# Patient Record
Sex: FEMALE | Race: WHITE | NOT HISPANIC OR LATINO | Employment: UNEMPLOYED | URBAN - METROPOLITAN AREA
[De-identification: names, ages, dates, MRNs, and addresses within clinical notes are randomized per-mention and may not be internally consistent; named-entity substitution may affect disease eponyms.]

---

## 2023-02-21 LAB
ALANINE AMINOTRANSFERASE (SGPT) (U/L) IN SER/PLAS: 36 U/L (ref 7–45)
ALBUMIN (G/DL) IN SER/PLAS: 4 G/DL (ref 3.4–5)
ALKALINE PHOSPHATASE (U/L) IN SER/PLAS: 57 U/L (ref 33–110)
ANION GAP IN SER/PLAS: 10 MMOL/L (ref 10–20)
ASPARTATE AMINOTRANSFERASE (SGOT) (U/L) IN SER/PLAS: 33 U/L (ref 9–39)
BASOPHILS (10*3/UL) IN BLOOD BY AUTOMATED COUNT: 0.02 X10E9/L (ref 0–0.1)
BASOPHILS/100 LEUKOCYTES IN BLOOD BY AUTOMATED COUNT: 0.3 % (ref 0–2)
BILIRUBIN TOTAL (MG/DL) IN SER/PLAS: 0.3 MG/DL (ref 0–1.2)
CALCIUM (MG/DL) IN SER/PLAS: 9.3 MG/DL (ref 8.6–10.6)
CARBON DIOXIDE, TOTAL (MMOL/L) IN SER/PLAS: 30 MMOL/L (ref 21–32)
CHLORIDE (MMOL/L) IN SER/PLAS: 106 MMOL/L (ref 98–107)
CHOLESTEROL (MG/DL) IN SER/PLAS: 181 MG/DL (ref 0–199)
CHOLESTEROL IN HDL (MG/DL) IN SER/PLAS: 65.1 MG/DL
CHOLESTEROL/HDL RATIO: 2.8
CREATININE (MG/DL) IN SER/PLAS: 0.97 MG/DL (ref 0.5–1.05)
EOSINOPHILS (10*3/UL) IN BLOOD BY AUTOMATED COUNT: 0.13 X10E9/L (ref 0–0.7)
EOSINOPHILS/100 LEUKOCYTES IN BLOOD BY AUTOMATED COUNT: 2.3 % (ref 0–6)
ERYTHROCYTE DISTRIBUTION WIDTH (RATIO) BY AUTOMATED COUNT: 14.7 % (ref 11.5–14.5)
ERYTHROCYTE MEAN CORPUSCULAR HEMOGLOBIN CONCENTRATION (G/DL) BY AUTOMATED: 30.9 G/DL (ref 32–36)
ERYTHROCYTE MEAN CORPUSCULAR VOLUME (FL) BY AUTOMATED COUNT: 95 FL (ref 80–100)
ERYTHROCYTES (10*6/UL) IN BLOOD BY AUTOMATED COUNT: 4.1 X10E12/L (ref 4–5.2)
GFR FEMALE: 70 ML/MIN/1.73M2
GLUCOSE (MG/DL) IN SER/PLAS: 70 MG/DL (ref 74–99)
HEMATOCRIT (%) IN BLOOD BY AUTOMATED COUNT: 39.1 % (ref 36–46)
HEMOGLOBIN (G/DL) IN BLOOD: 12.1 G/DL (ref 12–16)
HEPATITIS A VIRUS IGM AB PRESENCE IN SER/PLAS BY IMMUNOASSAY: NONREACTIVE
HEPATITIS B VIRUS CORE IGM AB PRESENCE IN SER/PLAS BY IMMUNOASSY: NONREACTIVE
HEPATITIS B VIRUS SURFACE AG PRESENCE IN SERUM: NONREACTIVE
HEPATITIS C VIRUS AB PRESENCE IN SERUM: NONREACTIVE
HIV 1/ 2 AG/AB SCREEN: NONREACTIVE
IMMATURE GRANULOCYTES/100 LEUKOCYTES IN BLOOD BY AUTOMATED COUNT: 0.2 % (ref 0–0.9)
LDL: 71 MG/DL (ref 0–99)
LEUKOCYTES (10*3/UL) IN BLOOD BY AUTOMATED COUNT: 5.8 X10E9/L (ref 4.4–11.3)
LYMPHOCYTES (10*3/UL) IN BLOOD BY AUTOMATED COUNT: 1.73 X10E9/L (ref 1.2–4.8)
LYMPHOCYTES/100 LEUKOCYTES IN BLOOD BY AUTOMATED COUNT: 30 % (ref 13–44)
MONOCYTES (10*3/UL) IN BLOOD BY AUTOMATED COUNT: 0.55 X10E9/L (ref 0.1–1)
MONOCYTES/100 LEUKOCYTES IN BLOOD BY AUTOMATED COUNT: 9.5 % (ref 2–10)
NEUTROPHILS (10*3/UL) IN BLOOD BY AUTOMATED COUNT: 3.32 X10E9/L (ref 1.2–7.7)
NEUTROPHILS/100 LEUKOCYTES IN BLOOD BY AUTOMATED COUNT: 57.7 % (ref 40–80)
NON HDL CHOLESTEROL: 116 MG/DL
NRBC (PER 100 WBCS) BY AUTOMATED COUNT: 0 /100 WBC (ref 0–0)
PLATELETS (10*3/UL) IN BLOOD AUTOMATED COUNT: 278 X10E9/L (ref 150–450)
POTASSIUM (MMOL/L) IN SER/PLAS: 4.5 MMOL/L (ref 3.5–5.3)
PROTEIN TOTAL: 6.2 G/DL (ref 6.4–8.2)
SODIUM (MMOL/L) IN SER/PLAS: 141 MMOL/L (ref 136–145)
TRIGLYCERIDE (MG/DL) IN SER/PLAS: 226 MG/DL (ref 0–149)
UREA NITROGEN (MG/DL) IN SER/PLAS: 18 MG/DL (ref 6–23)
VLDL: 45 MG/DL (ref 0–40)

## 2023-02-22 LAB
NIL(NEG) CONTROL SPOT COUNT: NORMAL
PANEL A SPOT COUNT: 0
PANEL B SPOT COUNT: 1
POS CONTROL SPOT COUNT: NORMAL
T-SPOT. TB INTERPRETATION: NEGATIVE

## 2023-10-30 ENCOUNTER — SPECIALTY PHARMACY (OUTPATIENT)
Dept: PHARMACY | Facility: CLINIC | Age: 53
End: 2023-10-30

## 2023-11-01 NOTE — PROGRESS NOTES
[facility] Clinical Note    Yue Monroe is a 53 y.o. female, who is on the specialty pharmacy service for management of: Dermatology Core with status of: (Enrolled)     Yue was contacted on 11/1/2023.    Refer to the encounter summary report for documentation details about patient counseling and education.      Medication Adherence  The importance of adherence was discussed with the patient and they were advised to take the medication as prescribed by their provider. Yue was encouraged to call her physician's office if they have a question regarding a missed dose.     Conclusion  Rate your quality of life on scale of 1-10: -- (unable to assess)  Rate your satisfaction with  Specialty Pharmacy on scale of 1-10: 10 - Completely satisfied      Patient advised to contact the pharmacy if there are any changes to her medication list, including prescriptions, OTC medications, herbal products, or supplements. Patient was advised of [facility]’s dispensing process, refill timeline, contact information ([phone]), and patient management follow up. Patient confirmed understanding of education conducted during assessment. All patient questions and concerns were addressed to the best of my ability. Patient was encouraged to contact the specialty pharmacy with any questions or concerns.    Confirmed follow-up outreaches are properly scheduled. Reviewed goals of therapy in the program targets.    Rex Bee, PharmD

## 2023-12-16 ENCOUNTER — SPECIALTY PHARMACY (OUTPATIENT)
Dept: PHARMACY | Facility: CLINIC | Age: 53
End: 2023-12-16

## 2023-12-20 PROCEDURE — RXMED WILLOW AMBULATORY MEDICATION CHARGE

## 2023-12-26 ENCOUNTER — PHARMACY VISIT (OUTPATIENT)
Dept: PHARMACY | Facility: CLINIC | Age: 53
End: 2023-12-26
Payer: MEDICAID

## 2024-02-27 ENCOUNTER — OFFICE VISIT (OUTPATIENT)
Dept: DERMATOLOGY | Facility: CLINIC | Age: 54
End: 2024-02-27
Payer: COMMERCIAL

## 2024-02-27 ENCOUNTER — LAB (OUTPATIENT)
Dept: LAB | Facility: LAB | Age: 54
End: 2024-02-27
Payer: COMMERCIAL

## 2024-02-27 DIAGNOSIS — L40.9 PSORIASIS: Primary | ICD-10-CM

## 2024-02-27 DIAGNOSIS — Z79.899 HIGH RISK MEDICATION USE: ICD-10-CM

## 2024-02-27 DIAGNOSIS — I83.93 SPIDER VEINS OF BOTH LOWER EXTREMITIES: ICD-10-CM

## 2024-02-27 DIAGNOSIS — D23.9 INTRADERMAL NEVUS: ICD-10-CM

## 2024-02-27 DIAGNOSIS — L40.9 PSORIASIS: ICD-10-CM

## 2024-02-27 LAB
BASOPHILS # BLD AUTO: 0.03 X10*3/UL (ref 0–0.1)
BASOPHILS NFR BLD AUTO: 0.2 %
EOSINOPHIL # BLD AUTO: 0.06 X10*3/UL (ref 0–0.7)
EOSINOPHIL NFR BLD AUTO: 0.4 %
ERYTHROCYTE [DISTWIDTH] IN BLOOD BY AUTOMATED COUNT: 15.5 % (ref 11.5–14.5)
HCT VFR BLD AUTO: 34.6 % (ref 36–46)
HGB BLD-MCNC: 10.8 G/DL (ref 12–16)
HIV 1+2 AB+HIV1 P24 AG SERPL QL IA: NONREACTIVE
IMM GRANULOCYTES # BLD AUTO: 0.12 X10*3/UL (ref 0–0.7)
IMM GRANULOCYTES NFR BLD AUTO: 0.9 % (ref 0–0.9)
LYMPHOCYTES # BLD AUTO: 1.61 X10*3/UL (ref 1.2–4.8)
LYMPHOCYTES NFR BLD AUTO: 11.6 %
MCH RBC QN AUTO: 28.2 PG (ref 26–34)
MCHC RBC AUTO-ENTMCNC: 31.2 G/DL (ref 32–36)
MCV RBC AUTO: 90 FL (ref 80–100)
MONOCYTES # BLD AUTO: 1.13 X10*3/UL (ref 0.1–1)
MONOCYTES NFR BLD AUTO: 8.1 %
NEUTROPHILS # BLD AUTO: 10.94 X10*3/UL (ref 1.2–7.7)
NEUTROPHILS NFR BLD AUTO: 78.8 %
NRBC BLD-RTO: 0 /100 WBCS (ref 0–0)
PLATELET # BLD AUTO: 610 X10*3/UL (ref 150–450)
RBC # BLD AUTO: 3.83 X10*6/UL (ref 4–5.2)
WBC # BLD AUTO: 13.9 X10*3/UL (ref 4.4–11.3)

## 2024-02-27 PROCEDURE — 86803 HEPATITIS C AB TEST: CPT

## 2024-02-27 PROCEDURE — 86706 HEP B SURFACE ANTIBODY: CPT

## 2024-02-27 PROCEDURE — 85025 COMPLETE CBC W/AUTO DIFF WBC: CPT

## 2024-02-27 PROCEDURE — 80053 COMPREHEN METABOLIC PANEL: CPT

## 2024-02-27 PROCEDURE — 80061 LIPID PANEL: CPT

## 2024-02-27 PROCEDURE — 86481 TB AG RESPONSE T-CELL SUSP: CPT

## 2024-02-27 PROCEDURE — 99214 OFFICE O/P EST MOD 30 MIN: CPT | Performed by: DERMATOLOGY

## 2024-02-27 PROCEDURE — 36415 COLL VENOUS BLD VENIPUNCTURE: CPT

## 2024-02-27 PROCEDURE — 87340 HEPATITIS B SURFACE AG IA: CPT

## 2024-02-27 PROCEDURE — 86704 HEP B CORE ANTIBODY TOTAL: CPT

## 2024-02-27 PROCEDURE — 87389 HIV-1 AG W/HIV-1&-2 AB AG IA: CPT

## 2024-02-27 RX ORDER — GABAPENTIN 300 MG/1
300 CAPSULE ORAL 3 TIMES DAILY
COMMUNITY
Start: 2024-03-05 | End: 2024-04-04

## 2024-02-27 RX ORDER — CARBAMAZEPINE 100 MG/1
100 TABLET, EXTENDED RELEASE ORAL 2 TIMES DAILY
COMMUNITY
Start: 2023-12-06 | End: 2024-12-05

## 2024-02-27 RX ORDER — ONDANSETRON 4 MG/1
TABLET, FILM COATED ORAL
COMMUNITY
Start: 2024-01-16

## 2024-02-27 RX ORDER — ATENOLOL AND CHLORTHALIDONE TABLET 50; 25 MG/1; MG/1
TABLET ORAL
COMMUNITY

## 2024-02-27 RX ORDER — PANTOPRAZOLE SODIUM 40 MG/1
40 TABLET, DELAYED RELEASE ORAL
COMMUNITY
Start: 2022-10-23

## 2024-02-27 RX ORDER — TIZANIDINE 2 MG/1
TABLET ORAL
COMMUNITY
Start: 2024-01-05

## 2024-02-27 RX ORDER — DICLOFENAC SODIUM 10 MG/G
4 GEL TOPICAL 2 TIMES DAILY
COMMUNITY
Start: 2024-02-05

## 2024-02-27 RX ORDER — HYDROXYZINE HYDROCHLORIDE 50 MG/1
TABLET, FILM COATED ORAL
COMMUNITY
Start: 2022-02-20

## 2024-02-27 NOTE — PROGRESS NOTES
Bonita Monroe is a 53 y.o. female who presents for the following: Psoriasis (Generalized- taking skyrizi - states clear today. ).     Review of Systems:  No other skin or systemic complaints other than what is documented elsewhere in the note.    The following portions of the chart were reviewed this encounter and updated as appropriate:   Allergies  Meds  Problems  Med Hx  Surg Hx  Fam Hx         Skin Cancer History  No skin cancer on file.      Specialty Problems    None       Objective   Well appearing patient in no apparent distress; mood and affect are within normal limits.    A focused skin examination was performed. All findings within normal limits unless otherwise noted below.    Assessment/Plan   1. Psoriasis  Clear today    Tolerating skyrizi well with complete clearance of Psoriasis  -Initiated May 2022  -Patient denies side effects  -Does have mild limited recurrence of psoriasis on the ears the week prior to injection  -Patient wishes to continue  -Patient declines use of topicals    -Given refractory nature of psoriasis, recommend to continue rx Skyrizi/Risankizumab  -Potential adverse effects of rx Skyrizi/Risankizumab include, but not limited to, increased risk of infections (both cutaneous and systemic), injection site reactions, upper respiratory infections, headaches.   -This medication requires annual blood work monitoring.   -No live vaccines while on this medication.   -This medication requires annual blood work monitoring, including CBC, CMP, HIV, Hepatitis B/C, Quantiferon gold. Orders placed today.  -Upon review of laboratory results, if permissible, will send rx to continue rx Skyrizi/Risankizumab 150 mg subcutaneously every 12 weeks.     Related Procedures  Comprehensive Metabolic Panel  Lipid Panel  HIV 1/2 Antigen/Antibody Screen with Reflex to Confirmation  CBC and Auto Differential  Hepatitis C Antibody  Hepatitis B Surface Antigen  Hepatitis B Surface  Antibody  Hepatitis B Core Antibody, Total  T-Spot TB    2. High risk medication use    Related Procedures  Comprehensive Metabolic Panel  Lipid Panel  HIV 1/2 Antigen/Antibody Screen with Reflex to Confirmation  CBC and Auto Differential  Hepatitis C Antibody  Hepatitis B Surface Antigen  Hepatitis B Surface Antibody  Hepatitis B Core Antibody, Total  T-Spot TB    3. Spider veins of both lower extremities (2)  Left Lower Leg - Anterior, Right Lower Leg - Anterior  Spider veins    Patient reports painful spider veins, potential for underlying varicosity on the R medial lower leg  -Referral to vascular for evaluation    Related Procedures  Referral to Vascular Surgery    4. Intradermal nevus  Right Upper Back  Dome shaped, flesh-colored/tan papule with comma shaped vessels on dermoscopy    (lesion of patient's concern)  -Discussed nature of diagnosis  -Benign-appearing features on examination  -Reassurance, recommend observation        Follow up in 1 year for PsO (Skyrizi)  Discussed if there are any changes or development of concerning symptoms (lesion/skin condition is changing, bleeding, enlarging, or worsening) the patient is to contact my office. The patient verbalizes understanding.    Maricruz Quezada MD  2/27/2024    Addendum: Patients labs received and reviewed. Permissible to continue Skyrizi. Patient did have an elevated white blood cell count; patient should follow up with PCP for this if persistent. Rx for 1 year Skyrizi sent to  Specialty Pharmacy. Nurse to notify the patient.  Maricruz Quezada MD  02/29/24    Addendum: Skyrizi approved until Jan 2026.    Mraicruz Quezada MD  01/28/25

## 2024-02-28 LAB
ALBUMIN SERPL BCP-MCNC: 3.5 G/DL (ref 3.4–5)
ALP SERPL-CCNC: 91 U/L (ref 33–110)
ALT SERPL W P-5'-P-CCNC: 16 U/L (ref 7–45)
ANION GAP SERPL CALC-SCNC: 17 MMOL/L (ref 10–20)
AST SERPL W P-5'-P-CCNC: 16 U/L (ref 9–39)
BILIRUB SERPL-MCNC: 0.3 MG/DL (ref 0–1.2)
BUN SERPL-MCNC: 23 MG/DL (ref 6–23)
CALCIUM SERPL-MCNC: 9.5 MG/DL (ref 8.6–10.6)
CHLORIDE SERPL-SCNC: 103 MMOL/L (ref 98–107)
CHOLEST SERPL-MCNC: 168 MG/DL (ref 0–199)
CHOLESTEROL/HDL RATIO: 3.7
CO2 SERPL-SCNC: 26 MMOL/L (ref 21–32)
CREAT SERPL-MCNC: 1.06 MG/DL (ref 0.5–1.05)
EGFRCR SERPLBLD CKD-EPI 2021: 63 ML/MIN/1.73M*2
GLUCOSE SERPL-MCNC: 80 MG/DL (ref 74–99)
HBV CORE AB SER QL: NONREACTIVE
HBV SURFACE AB SER-ACNC: <3.1 MIU/ML
HBV SURFACE AG SERPL QL IA: NONREACTIVE
HCV AB SER QL: NONREACTIVE
HDLC SERPL-MCNC: 46 MG/DL
LDLC SERPL CALC-MCNC: 84 MG/DL
NON HDL CHOLESTEROL: 122 MG/DL (ref 0–149)
POTASSIUM SERPL-SCNC: 4.7 MMOL/L (ref 3.5–5.3)
PROT SERPL-MCNC: 6.7 G/DL (ref 6.4–8.2)
SODIUM SERPL-SCNC: 141 MMOL/L (ref 136–145)
TRIGL SERPL-MCNC: 190 MG/DL (ref 0–149)
VLDL: 38 MG/DL (ref 0–40)

## 2024-02-29 LAB
NIL(NEG) CONTROL SPOT COUNT: NORMAL
PANEL A SPOT COUNT: 0
PANEL B SPOT COUNT: 0
POS CONTROL SPOT COUNT: NORMAL
T-SPOT. TB INTERPRETATION: NEGATIVE

## 2024-02-29 RX ORDER — RISANKIZUMAB-RZAA 150 MG/ML
150 INJECTION SUBCUTANEOUS
Qty: 1 ML | Refills: 3 | Status: SHIPPED | OUTPATIENT
Start: 2024-02-29

## 2024-03-20 ENCOUNTER — SPECIALTY PHARMACY (OUTPATIENT)
Dept: PHARMACY | Facility: CLINIC | Age: 54
End: 2024-03-20

## 2024-03-20 PROCEDURE — RXMED WILLOW AMBULATORY MEDICATION CHARGE

## 2024-03-21 ENCOUNTER — PHARMACY VISIT (OUTPATIENT)
Dept: PHARMACY | Facility: CLINIC | Age: 54
End: 2024-03-21
Payer: MEDICAID

## 2024-04-29 ENCOUNTER — SPECIALTY PHARMACY (OUTPATIENT)
Dept: PHARMACY | Facility: CLINIC | Age: 54
End: 2024-04-29

## 2024-04-29 NOTE — PROGRESS NOTES
[facility] Clinical Note    Yue Monroe is a 53 y.o. female, who is on the specialty pharmacy service of: Dermatology Core.  Yue Monroe is taking: Skyrizi.     Yue was contacted on 4/29/2024.    Refer to the encounter summary report for documentation details about patient counseling and education.      Impression/Plan  Is patient high risk? (potential patients:  pregnancy, geriatric, pediatric)  no   Is laboratory follow-up needed? no  Is a clinical intervention needed?  no  Next assessment date?  6 months   Additional comments:    Medication Adherence  The importance of adherence was discussed with the patient and they were advised to take the medication as prescribed by their provider. Yue was encouraged to call her physician's office if they have a question regarding a missed dose.     QOL/Patient Satisfaction  Rate your quality of life on scale of 1-10: -- (unable to assess)  Rate your satisfaction with  Specialty Pharmacy on scale of 1-10: 10 - Completely satisfied      Patient advised to contact the pharmacy if there are any changes to her medication list, including prescriptions, OTC medications, herbal products, or supplements. Patient was advised of [facility]’s dispensing process, refill timeline, contact information ([phone]), and patient management follow up. Patient confirmed understanding of education conducted during assessment. All patient questions and concerns were addressed to the best of my ability. Patient was encouraged to contact the specialty pharmacy with any questions or concerns.    Confirmed follow-up outreaches are properly scheduled. Reviewed goals of therapy in the program targets.    Rex Bee, PharmD

## 2024-06-12 ENCOUNTER — SPECIALTY PHARMACY (OUTPATIENT)
Dept: PHARMACY | Facility: CLINIC | Age: 54
End: 2024-06-12

## 2024-06-12 PROCEDURE — RXMED WILLOW AMBULATORY MEDICATION CHARGE

## 2024-06-13 ENCOUNTER — PHARMACY VISIT (OUTPATIENT)
Dept: PHARMACY | Facility: CLINIC | Age: 54
End: 2024-06-13
Payer: MEDICAID

## 2024-09-05 ENCOUNTER — SPECIALTY PHARMACY (OUTPATIENT)
Dept: PHARMACY | Facility: CLINIC | Age: 54
End: 2024-09-05

## 2024-09-05 PROCEDURE — RXMED WILLOW AMBULATORY MEDICATION CHARGE

## 2024-09-20 ENCOUNTER — PHARMACY VISIT (OUTPATIENT)
Dept: PHARMACY | Facility: CLINIC | Age: 54
End: 2024-09-20
Payer: MEDICAID

## 2024-10-18 ENCOUNTER — SPECIALTY PHARMACY (OUTPATIENT)
Dept: PHARMACY | Facility: CLINIC | Age: 54
End: 2024-10-18

## 2024-10-18 NOTE — PROGRESS NOTES
[facility] Clinical Note    Yue Monroe is a 54 y.o. female, who is on the specialty pharmacy service for management of:  Dermatology Core.    Yue Monroe is taking: Skyrizi.    Medication Receipt Date: last refilled 9/24/24  Medication Start Date (planned or actual): 05/2022    Yue was contacted on 10/18/2024 at 4:39 PM for a virtual pharmacy visit with encounter number [id number] from:   [facility]  [address] [place] [id number]  Dept: [phone]  Dept Fax: [phone]    Yue was offered a Telemedicine Video visit or Telephone visit.  Yue consented to a telephone visit, which was performed.    The most recent encounter visit with the referring prescriber Dr. Maricruz Quezada on 2/27/24 (Date) was reviewed.  Pharmacy will continue to collaborate in the care of this patient with the referring prescriber Dr. Maricruz Quezada.    General Assessment      Impression/Plan  IMPRESSION/PLAN:  Is patient high risk (potential patients:  pregnancy, geriatric, pediatric)?    Is laboratory follow-up needed?   Is a clinical intervention needed?   Next reassessment date?   Additional comments:     Refer to the encounter summary report for documentation details about patient counseling and education.      Medication Adherence    The importance of adherence was discussed with the patient and they were advised to take the medication as prescribed by their provider. Patient was encouraged to call their physician's office if they have a question regarding a missed dose.        Patient was advised to contact the pharmacy if there are any changes to their medication list, including prescriptions, OTC medications, herbal products, or supplements. Patient was advised of [facility]'s dispensing process, refill timeline, contact information ([phone]), and patient management follow up. Patient confirmed  understanding of education conducted during assessment. All patient questions and concerns were addressed to the best of my ability. Patient was encouraged to contact the specialty pharmacy with any questions or concerns.    Confirmed follow-up outreaches are properly scheduled and reviewed goals of therapy with the patient.        Rex Bee, PharmD

## 2024-12-09 ENCOUNTER — SPECIALTY PHARMACY (OUTPATIENT)
Dept: PHARMACY | Facility: CLINIC | Age: 54
End: 2024-12-09

## 2025-01-27 ENCOUNTER — TELEPHONE (OUTPATIENT)
Dept: DERMATOLOGY | Facility: CLINIC | Age: 55
End: 2025-01-27
Payer: COMMERCIAL

## 2025-02-27 DIAGNOSIS — L40.9 PSORIASIS: ICD-10-CM

## 2025-02-27 DIAGNOSIS — Z79.899 HIGH RISK MEDICATION USE: ICD-10-CM

## 2025-02-27 RX ORDER — RISANKIZUMAB-RZAA 150 MG/ML
INJECTION SUBCUTANEOUS
Qty: 1 ML | Refills: 0 | Status: SHIPPED | OUTPATIENT
Start: 2025-02-27

## 2025-03-04 ENCOUNTER — APPOINTMENT (OUTPATIENT)
Dept: DERMATOLOGY | Facility: CLINIC | Age: 55
End: 2025-03-04
Payer: COMMERCIAL

## 2025-03-05 ENCOUNTER — APPOINTMENT (OUTPATIENT)
Dept: DERMATOLOGY | Facility: CLINIC | Age: 55
End: 2025-03-05
Payer: COMMERCIAL

## 2025-03-19 ENCOUNTER — APPOINTMENT (OUTPATIENT)
Dept: DERMATOLOGY | Facility: CLINIC | Age: 55
End: 2025-03-19
Payer: COMMERCIAL

## 2025-03-19 DIAGNOSIS — Z79.899 HIGH RISK MEDICATION USE: ICD-10-CM

## 2025-03-19 DIAGNOSIS — L65.0 TELOGEN EFFLUVIUM: ICD-10-CM

## 2025-03-19 DIAGNOSIS — L40.9 PSORIASIS: Primary | ICD-10-CM

## 2025-03-19 DIAGNOSIS — L98.9 DERMATOLOGIC PROBLEM: ICD-10-CM

## 2025-03-19 PROCEDURE — 99214 OFFICE O/P EST MOD 30 MIN: CPT | Performed by: DERMATOLOGY

## 2025-03-19 RX ORDER — MIDODRINE HYDROCHLORIDE 5 MG/1
15 TABLET ORAL 3 TIMES DAILY
COMMUNITY
Start: 2024-10-21

## 2025-03-19 RX ORDER — LANOLIN ALCOHOL/MO/W.PET/CERES
CREAM (GRAM) TOPICAL
COMMUNITY
Start: 2025-03-05

## 2025-03-19 RX ORDER — ACETAMINOPHEN 500 MG
150 TABLET ORAL
COMMUNITY
Start: 2024-12-06

## 2025-03-19 RX ORDER — POTASSIUM CHLORIDE 1.5 G/1
20 POWDER, FOR SOLUTION ORAL
COMMUNITY
Start: 2024-11-20 | End: 2025-11-20

## 2025-03-19 RX ORDER — GABAPENTIN 400 MG/1
1 CAPSULE ORAL
COMMUNITY
Start: 2025-02-25

## 2025-03-19 RX ORDER — ONDANSETRON 4 MG/1
TABLET, ORALLY DISINTEGRATING ORAL
COMMUNITY
Start: 2024-10-22

## 2025-03-19 RX ORDER — FUROSEMIDE 20 MG/1
20 TABLET ORAL
COMMUNITY
Start: 2025-02-18

## 2025-03-19 RX ORDER — HYDROXYZINE PAMOATE 25 MG/1
CAPSULE ORAL
COMMUNITY

## 2025-03-19 RX ORDER — ALBUTEROL SULFATE 90 UG/1
2 INHALANT RESPIRATORY (INHALATION) EVERY 4 HOURS PRN
COMMUNITY
Start: 2024-04-11 | End: 2025-04-11

## 2025-03-19 NOTE — PROGRESS NOTES
Bonita Monroe is a 54 y.o. female who presents for the following: Psoriasis (Pt on skyrizi- good response. Pt states clear today but has flares about 2 weeks before injection due, ears and eyebrows. ) and Alopecia (Thinning/balding- x 3 months- pt states was hospitalized for 2-3 months, got out of rehab in November. Pt states has not tried anything for treatments. ).     Review of Systems:  No other skin or systemic complaints other than what is documented elsewhere in the note.    The following portions of the chart were reviewed this encounter and updated as appropriate:   Allergies  Meds  Problems  Med Hx  Surg Hx  Fam Hx                Objective   Well appearing patient in no apparent distress; mood and affect are within normal limits.    A focused skin examination was performed. All findings within normal limits unless otherwise noted below.    Assessment/Plan   1. Psoriasis  Clear today    Tolerating skyrizi well with complete clearance of Psoriasis  -Initiated May 2022  -Patient denies side effects  -Does have mild limited recurrence of psoriasis on the ears the week prior to injection  -Patient wishes to continue  -Patient declines use of topicals  -Due for labs; ordered today  -Will send rx for Skyrizi once labs received  -May start dermanail for brittle toenails    -Given refractory nature of psoriasis, recommend to continue rx Skyrizi/Risankizumab  -Potential adverse effects of rx Skyrizi/Risankizumab include, but not limited to, increased risk of infections (both cutaneous and systemic), injection site reactions, upper respiratory infections, headaches.   -This medication requires annual blood work monitoring.   -No live vaccines while on this medication.   -This medication requires annual blood work monitoring, including CBC, CMP, HIV, Hepatitis B/C, Quantiferon gold. Orders placed today.  -Upon review of laboratory results, if permissible, will send rx to continue rx  Skyrizi/Risankizumab 150 mg subcutaneously every 12 weeks.     Related Procedures  Comprehensive Metabolic Panel  Lipid Panel  HIV 1/2 Antigen/Antibody Screen with Reflex to Confirmation  CBC and Auto Differential  Hepatitis C Antibody  Hepatitis B Surface Antigen  Hepatitis B Surface Antibody  Hepatitis B Core Antibody, Total  T-Spot TB    Related Medications  Skyrizi 150 mg/mL pen injector pen  INJECT 1 PEN UNDER THE SKIN EVERY 12 WEEKS    2. High risk medication use    Related Procedures  Comprehensive Metabolic Panel  Lipid Panel  HIV 1/2 Antigen/Antibody Screen with Reflex to Confirmation  CBC and Auto Differential  Hepatitis C Antibody  Hepatitis B Surface Antigen  Hepatitis B Surface Antibody  Hepatitis B Core Antibody, Total  T-Spot TB    Related Medications  Skyrizi 150 mg/mL pen injector pen  INJECT 1 PEN UNDER THE SKIN EVERY 12 WEEKS    3. Telogen effluvium  Scalp  Normal scalp exam    -Discussed the nature of the condition, discussed natural process of anagen, catagen and telogen phases and common triggers of effluvium  -Discussed the expected course, this is most often self-resolving. After a period of several months of intense shedding, the shedding will gradually taper off and then the hair will begin to regrow.  -Reassurance    4. Dermatologic problem    Related Procedures  Follow Up In Dermatology - Established Patient        Follow up in 1 year for PsO  Discussed if there are any changes or development of concerning symptoms (lesion/skin condition is changing, bleeding, enlarging, or worsening) the patient is to contact my office. The patient verbalizes understanding.    Maricruz Quezada MD  3/19/2025

## 2025-05-14 LAB
ALBUMIN SERPL-MCNC: 4.5 G/DL (ref 3.6–5.1)
ALP SERPL-CCNC: 70 U/L (ref 37–153)
ALT SERPL-CCNC: 24 U/L (ref 6–29)
ANION GAP SERPL CALCULATED.4IONS-SCNC: 10 MMOL/L (CALC) (ref 7–17)
AST SERPL-CCNC: 26 U/L (ref 10–35)
BASOPHILS # BLD AUTO: 21 CELLS/UL (ref 0–200)
BASOPHILS NFR BLD AUTO: 0.4 %
BILIRUB SERPL-MCNC: 0.4 MG/DL (ref 0.2–1.2)
BUN SERPL-MCNC: 23 MG/DL (ref 7–25)
CALCIUM SERPL-MCNC: 9.9 MG/DL (ref 8.6–10.4)
CHLORIDE SERPL-SCNC: 107 MMOL/L (ref 98–110)
CHOLEST SERPL-MCNC: 159 MG/DL
CHOLEST/HDLC SERPL: 2.3 (CALC)
CO2 SERPL-SCNC: 26 MMOL/L (ref 20–32)
CREAT SERPL-MCNC: 0.88 MG/DL (ref 0.5–1.03)
EGFRCR SERPLBLD CKD-EPI 2021: 78 ML/MIN/1.73M2
EOSINOPHIL # BLD AUTO: 140 CELLS/UL (ref 15–500)
EOSINOPHIL NFR BLD AUTO: 2.7 %
ERYTHROCYTE [DISTWIDTH] IN BLOOD BY AUTOMATED COUNT: 15.5 % (ref 11–15)
GLUCOSE SERPL-MCNC: 85 MG/DL (ref 65–99)
HBV CORE AB SERPL QL IA: NORMAL
HBV SURFACE AB SERPL IA-ACNC: <5 MIU/ML
HBV SURFACE AG SERPL QL IA: NORMAL
HCT VFR BLD AUTO: 36.3 % (ref 35–45)
HCV AB SERPL QL IA: NORMAL
HDLC SERPL-MCNC: 68 MG/DL
HGB BLD-MCNC: 10.9 G/DL (ref 11.7–15.5)
HIV 1+2 AB+HIV1 P24 AG SERPL QL IA: NORMAL
IGNF NEG CNTRL BLD: NORMAL
LDLC SERPL CALC-MCNC: 72 MG/DL (CALC)
LYMPHOCYTES # BLD AUTO: 1986 CELLS/UL (ref 850–3900)
LYMPHOCYTES NFR BLD AUTO: 38.2 %
M TB IFN-G BLD-IMP: NEGATIVE
MCH RBC QN AUTO: 26 PG (ref 27–33)
MCHC RBC AUTO-ENTMCNC: 30 G/DL (ref 32–36)
MCV RBC AUTO: 86.4 FL (ref 80–100)
MITOGEN IGNF.SPOT COUNT BLD: NORMAL
MONOCYTES # BLD AUTO: 390 CELLS/UL (ref 200–950)
MONOCYTES NFR BLD AUTO: 7.5 %
NEUTROPHILS # BLD AUTO: 2662 CELLS/UL (ref 1500–7800)
NEUTROPHILS NFR BLD AUTO: 51.2 %
NONHDLC SERPL-MCNC: 91 MG/DL (CALC)
PLATELET # BLD AUTO: 320 THOUSAND/UL (ref 140–400)
PMV BLD REES-ECKER: 10.2 FL (ref 7.5–12.5)
POTASSIUM SERPL-SCNC: 4.8 MMOL/L (ref 3.5–5.3)
PROT SERPL-MCNC: 6.6 G/DL (ref 6.1–8.1)
QUEST PANEL A SPOT COUNT: 1
QUEST PANEL B SPOT COUNT: 0
RBC # BLD AUTO: 4.2 MILLION/UL (ref 3.8–5.1)
SODIUM SERPL-SCNC: 143 MMOL/L (ref 135–146)
TRIGL SERPL-MCNC: 105 MG/DL
WBC # BLD AUTO: 5.2 THOUSAND/UL (ref 3.8–10.8)

## 2025-05-16 ENCOUNTER — SPECIALTY PHARMACY (OUTPATIENT)
Dept: PHARMACY | Facility: CLINIC | Age: 55
End: 2025-05-16

## 2025-05-17 PROCEDURE — RXMED WILLOW AMBULATORY MEDICATION CHARGE

## 2025-05-21 ENCOUNTER — PHARMACY VISIT (OUTPATIENT)
Dept: PHARMACY | Facility: CLINIC | Age: 55
End: 2025-05-21
Payer: MEDICAID

## 2025-06-12 ENCOUNTER — TELEPHONE (OUTPATIENT)
Dept: DERMATOLOGY | Facility: CLINIC | Age: 55
End: 2025-06-12
Payer: COMMERCIAL

## 2025-06-12 NOTE — TELEPHONE ENCOUNTER
Pt called and left voicemail stating she would like to make an appointment for a lesion on her R knee. Pt stated in vm this was suggested by her PCP as the lesion is crusty in texture and is not healing when scratched off. Pt stated in vm PCP recommended a biopsy by pt's dermatologist. Pt stated in vm the soonest appointment she saw was in October, but is agreeable to date if no sooner date can be found. RN forwarded information to scheduling team.    Caron COLEN RN

## 2025-06-13 ENCOUNTER — SPECIALTY PHARMACY (OUTPATIENT)
Dept: PHARMACY | Facility: CLINIC | Age: 55
End: 2025-06-13

## 2025-07-18 ASSESSMENT — DERMATOLOGY QUALITY OF LIFE (QOL) ASSESSMENT
RATE HOW BOTHERED YOU ARE BY EFFECTS OF YOUR SKIN PROBLEMS ON YOUR ACTIVITIES (EG, GOING OUT, ACCOMPLISHING WHAT YOU WANT, WORK ACTIVITIES OR YOUR RELATIONSHIPS WITH OTHERS): 0 - NEVER BOTHERED
RATE HOW BOTHERED YOU ARE BY SYMPTOMS OF YOUR SKIN PROBLEM (EG, ITCHING, STINGING BURNING, HURTING OR SKIN IRRITATION): 2
WHAT SINGLE SKIN CONDITION LISTED BELOW IS THE PATIENT ANSWERING THE QUALITY-OF-LIFE ASSESSMENT QUESTIONS ABOUT: PSORIASIS
DATE THE QUALITY-OF-LIFE ASSESSMENT WAS COMPLETED: 67404
RATE HOW EMOTIONALLY BOTHERED YOU ARE BY YOUR SKIN PROBLEM (FOR EXAMPLE, WORRY, EMBARRASSMENT, FRUSTRATION): 0 - NEVER BOTHERED
RATE HOW EMOTIONALLY BOTHERED YOU ARE BY YOUR SKIN PROBLEM (FOR EXAMPLE, WORRY, EMBARRASSMENT, FRUSTRATION): 0 - NEVER BOTHERED
RATE HOW BOTHERED YOU ARE BY EFFECTS OF YOUR SKIN PROBLEMS ON YOUR ACTIVITIES (EG, GOING OUT, ACCOMPLISHING WHAT YOU WANT, WORK ACTIVITIES OR YOUR RELATIONSHIPS WITH OTHERS): 0 - NEVER BOTHERED
WHAT SINGLE SKIN CONDITION LISTED BELOW IS THE PATIENT ANSWERING THE QUALITY-OF-LIFE ASSESSMENT QUESTIONS ABOUT: PSORIASIS
RATE HOW BOTHERED YOU ARE BY SYMPTOMS OF YOUR SKIN PROBLEM (EG, ITCHING, STINGING BURNING, HURTING OR SKIN IRRITATION): 2

## 2025-07-18 ASSESSMENT — PATIENT GLOBAL ASSESSMENT (PGA): WHAT IS THE PGA: PATIENT GLOBAL ASSESSMENT:  1 - CLEAR

## 2025-07-19 ENCOUNTER — APPOINTMENT (OUTPATIENT)
Dept: DERMATOLOGY | Facility: CLINIC | Age: 55
End: 2025-07-19
Payer: COMMERCIAL

## 2025-07-19 DIAGNOSIS — L82.0 SEBORRHEIC KERATOSIS, INFLAMED: Primary | ICD-10-CM

## 2025-07-19 PROCEDURE — 17110 DESTRUCTION B9 LES UP TO 14: CPT | Performed by: NURSE PRACTITIONER

## 2025-07-19 PROCEDURE — 99213 OFFICE O/P EST LOW 20 MIN: CPT | Performed by: NURSE PRACTITIONER

## 2025-07-19 NOTE — PROGRESS NOTES
Subjective     Yue Monroe is a 55 y.o. female who presents for the following: Suspicious Skin Lesion (Lesion on Right Knee. No hx of skin cancer. Patient states she picks at lesion but lesion has no symptoms. Hx of Psoriasis and Psoriatic Arthritis. Current Tx Good.).     Intake Questions  Do you have any new or changing Lesions?: (Patient-Rptd) (P) Yes  Where are these new or changing lesion(s) located?: (Patient-Rptd) (P) Inside of Right knee  Are you an organ transplant recipient?: (Patient-Rptd) (P) No  Have you had or do you have a Staph Infection?: (Patient-Rptd) (P) No  Have you had or do you have Vacular Disease?: (Patient-Rptd) (P) No  Do you use sunscreen?: (Patient-Rptd) (P) Occasionally  Do you use a tanning bed?: (Patient-Rptd) (P) No  Are you trying to get pregnant?: (Patient-Rptd) (P) No  Are you on birth control?: (Patient-Rptd) (P) No    Review of Systems:  No other skin or systemic complaints other than what is documented elsewhere in the note.    The following portions of the chart were reviewed this encounter and updated as appropriate:  Tobacco  Allergies  Meds  Problems  Med Hx  Surg Hx  Fam Hx         Skin Cancer History  Biopsy Log Book  No skin cancers from Specimen Tracking.    Additional History      Specialty Problems    None    Past Medical History:  Yue Monroe  has a past medical history of Anxiety (Life long), Arthritis (2018), Peptic ulceration (1998), Psoriasis (2021), and Sleep difficulties (Life long).    Past Surgical History:  Yue Monroe  has no past surgical history on file.    Family History:  Patient family history is not on file.    Social History:  Yue Monroe  reports that she quit smoking about 9 months ago. Her smoking use included cigarettes. She started smoking about 41 years ago. She has a 20.4 pack-year smoking history. She has never used smokeless tobacco. She reports that she does not drink alcohol and does not use drugs.    Allergies:  Codeine,  "Morphine, Oxycodone, Hydrocodone, Hydromorphone, Oxycodone-acetaminophen, Penicillins, Propoxyphene, Propoxyphene n-acetaminophen, and Adhesive tape-silicones    Current Medications / CAM's:  Current Medications[1]     Objective   Well appearing patient in no apparent distress; mood and affect are within normal limits.    A focused skin examination was performed. All findings within normal limits unless otherwise noted below.    Assessment/Plan   Skin Exam  1. SEBORRHEIC KERATOSIS, INFLAMED  Right Knee - Anterior  Waxy, \"stuck-on,\" verrucous-appearing papule  - Discussed the differential, likely irritated seborrheic keratosis (vs wart).   - Patient requests cryotherapy today for these clinically inflamed lesions  -Possible side effects of liquid nitrogen treatment reviewed including formation of blisters, crusting, tenderness, scar, and discoloration which may be permanent.  - Destr of lesion - Right Knee - Anterior  Complexity: simple    Destruction method: cryotherapy    Informed consent: discussed and consent obtained    Lesion destroyed using liquid nitrogen: Yes    Outcome: patient tolerated procedure well with no complications    Post-procedure details: wound care instructions given             [1]   Current Outpatient Medications:     albuterol 90 mcg/actuation inhaler, Inhale 2 puffs every 4 hours if needed., Disp: , Rfl:     apixaban (Eliquis) 5 mg tablet, Take 1 tablet (5 mg) by mouth twice a day., Disp: , Rfl:     cholecalciferol (Vitamin D-3) 50 mcg (2,000 unit) capsule, Take 3 capsules (150 mcg) by mouth once daily., Disp: , Rfl:     furosemide (Lasix) 20 mg tablet, Take 1 tablet (20 mg) by mouth once daily., Disp: , Rfl:     gabapentin (Neurontin) 400 mg capsule, Take 1 capsule (400 mg) by mouth 3 times a day., Disp: , Rfl:     hydrOXYzine HCL (Atarax) 50 mg tablet, TAKE 1 TABLET(50 MG) BY MOUTH EVERY 8 HOURS AS NEEDED FOR ANXIETY, Disp: , Rfl:     hydrOXYzine pamoate (Vistaril) 25 mg capsule, , Disp: " , Rfl:     Klor-Con 20 mEq packet, 20 mEq., Disp: , Rfl:     midodrine (Proamatine) 5 mg tablet, Take 3 tablets (15 mg) by mouth 3 times a day., Disp: , Rfl:     ondansetron (Zofran) 4 mg tablet, TAKE 1 TABLET(4 MG) BY MOUTH EVERY 8 HOURS AS NEEDED FOR NAUSEA, Disp: , Rfl:     ondansetron ODT (Zofran-ODT) 4 mg disintegrating tablet, , Disp: , Rfl:     pantoprazole (ProtoNix) 40 mg EC tablet, Take 1 tablet (40 mg) by mouth once daily., Disp: , Rfl:     risankizumab-rzaa (Skyrizi) 150 mg/mL pen injector pen, Inject 1 mL (150 mg) under the skin every 3 months., Disp: 1 mL, Rfl: 3    thiamine 100 mg tablet, , Disp: , Rfl:     tiZANidine (Zanaflex) 2 mg tablet, TAKE 1 TABLET(2 MG) BY MOUTH EVERY NIGHT, Disp: , Rfl:

## 2025-07-19 NOTE — Clinical Note
- Discussed the differential, likely irritated seborrheic keratosis (vs wart).   - Patient requests cryotherapy today for these clinically inflamed lesions  -Possible side effects of liquid nitrogen treatment reviewed including formation of blisters, crusting, tenderness, scar, and discoloration which may be permanent.

## 2025-08-06 ENCOUNTER — SPECIALTY PHARMACY (OUTPATIENT)
Dept: PHARMACY | Facility: CLINIC | Age: 55
End: 2025-08-06

## 2025-08-06 PROCEDURE — RXMED WILLOW AMBULATORY MEDICATION CHARGE

## 2025-08-14 ENCOUNTER — PHARMACY VISIT (OUTPATIENT)
Dept: PHARMACY | Facility: CLINIC | Age: 55
End: 2025-08-14
Payer: MEDICAID

## 2026-03-18 ENCOUNTER — APPOINTMENT (OUTPATIENT)
Dept: DERMATOLOGY | Facility: CLINIC | Age: 56
End: 2026-03-18
Payer: COMMERCIAL

## 2026-03-25 ENCOUNTER — APPOINTMENT (OUTPATIENT)
Dept: DERMATOLOGY | Facility: CLINIC | Age: 56
End: 2026-03-25
Payer: COMMERCIAL